# Patient Record
Sex: FEMALE | Race: WHITE | NOT HISPANIC OR LATINO | Employment: UNEMPLOYED | URBAN - METROPOLITAN AREA
[De-identification: names, ages, dates, MRNs, and addresses within clinical notes are randomized per-mention and may not be internally consistent; named-entity substitution may affect disease eponyms.]

---

## 2019-08-06 ENCOUNTER — TRANSCRIBE ORDERS (OUTPATIENT)
Dept: PHYSICAL THERAPY | Facility: CLINIC | Age: 74
End: 2019-08-06

## 2019-08-06 DIAGNOSIS — M51.36 DDD (DEGENERATIVE DISC DISEASE), LUMBAR: ICD-10-CM

## 2019-08-06 DIAGNOSIS — M54.32 LEFT SIDED SCIATICA: Primary | ICD-10-CM

## 2019-08-14 ENCOUNTER — OFFICE VISIT (OUTPATIENT)
Dept: PHYSICAL THERAPY | Facility: CLINIC | Age: 74
End: 2019-08-14

## 2019-08-14 DIAGNOSIS — M51.36 DDD (DEGENERATIVE DISC DISEASE), LUMBAR: ICD-10-CM

## 2019-08-14 DIAGNOSIS — M54.32 LEFT SIDED SCIATICA: Primary | ICD-10-CM

## 2019-08-14 PROCEDURE — 97161 PT EVAL LOW COMPLEX 20 MIN: CPT | Performed by: PHYSICAL THERAPIST

## 2019-08-14 PROCEDURE — 97110 THERAPEUTIC EXERCISES: CPT | Performed by: PHYSICAL THERAPIST

## 2019-08-14 NOTE — PROGRESS NOTES
Physical Therapy Initial Evaluation and Plan of Care      Patient: Gillian Del Rio   : 1945  Diagnosis/ICD-10 Code:  Left sided sciatica [M54.32]  Referring practitioner: Barber Mcnamara MD  Date of Initial Visit: 2019  Today's Date: 2019  Patient seen for 1 sessions           Subjective Questionnaire: Oswestry: 21/50=42%      Subjective Evaluation    History of Present Illness  Date of onset: 2019  Mechanism of injury: Insidious onset.  Doesn't recall injury.  Pain is worst when she lifts her leg on her back to get out of bed.      Subjective comment: 73 YOF with left anterior and posterior hip pain and low back pain that started in the middle of 2018.  She notes she had left hip replacement surgery in , and the pain started a few months after that.  She reports pain is mostly when she raises her leg straight to get out of bed.  Improves with walking  Patient Occupation: Retired Quality of life: good    Pain  Current pain ratin  At best pain ratin  At worst pain rating: 10  Location: Left anterior hip, posterior hip, low back  Quality: sharp  Relieving factors: rest and change in position  Aggravating factors: lifting  Progression: worsening    Social Support  Lives with: spouse    Diagnostic Tests  X-ray: normal  MRI studies: normal    Treatments  Treatments tried: Exercies provided by MD office worsened symptoms.  Patient Goals  Patient goals for therapy: decreased pain, increased strength and independence with ADLs/IADLs  Patient goal: Be able to get out of bed without pain           Objective       Tenderness     Additional Tenderness Details  Along vertebra of lumbar spine and anterior hip.  Negative medial or lateral hip tenderness with palpation of adductors and abductors.  Rectus femoris TTP.    Neurological Testing     Sensation     Lumbar   Left   Intact: light touch    Right   Intact: light touch    Reflexes   Left   Patellar (L4): normal (2+)  Achilles (S1):  normal (2+)    Right   Patellar (L4): normal (2+)  Achilles (S1): normal (2+)    Active Range of Motion     Lumbar   Normal active range of motion    Additional Active Range of Motion Details  Hip ROM WNL.  Noted anterior hip tightness left thigh.  90 degree knee flexion prone with tightness in front of thigh.    Strength/Myotome Testing     Left Hip   Planes of Motion   Flexion: 4-  Extension: 4-  Abduction: 4  Adduction: 4    Left Knee   Flexion: 4  Extension: 4    Left Ankle/Foot   Dorsiflexion: 4  Plantar flexion: 4    Tests       Thoracic   Negative slump.     Lumbar   Negative repeated extension and repeated flexion.     Left   Positive femoral stretch.   Negative passive lumbar instability and passive SLR.     Right   Negative passive SLR.     Left Knee   Positive peroneal nerve tension.     Ambulation     Observational Gait   Gait: within functional limits          Assessment & Plan     Assessment  Impairments: abnormal gait, abnormal muscle tone, abnormal or restricted ROM, activity intolerance, impaired balance, impaired physical strength, lacks appropriate home exercise program, pain with function and safety issue  Assessment details: Pt. Is an 73 YOF with reports of left hip pain that worsens when she gets out of bed first thing in the morning and at night when she is lying down.  She presents with impaired anterior LE chain mobility and left hip/trunk weakness. She reports a 42% RAUL score/impairment.   Physical therapy services are recommended to restore bed mobility, improve safety, and improved functional mobility.    Barriers to therapy: none  Prognosis: good  Functional Limitations: sleeping, walking, moving in bed, standing and reaching overhead  Goals  Plan Goals: LTGx2 weeks:  -Pt. Will be I with HEP.  -Pt. Will be able to lift leg out of bed without pain.  -Pt. Will report 10% improvement or greater on RAUL.      Plan  Therapy options: will be seen for skilled physical therapy services  Planned  therapy interventions: manual therapy, neuromuscular re-education, strengthening, stretching, therapeutic activities, postural training, flexibility, functional ROM exercises, gait training, home exercise program, IADL retraining and transfer training  Frequency: 2x week  Duration in visits: 4  Treatment plan discussed with: patient        Manual Therapy:         mins  80620;  Therapeutic Exercise:    10     mins  24685;     Neuromuscular Martha:        mins  42083;    Therapeutic Activity:          mins  23155;     Gait Training:           mins  43908;     Ultrasound:          mins  79033;    Electrical Stimulation:         mins  84816 ( );  Dry Needling          mins self-pay    Timed Treatment:   50   mins   Total Treatment:     50   mins    PT SIGNATURE: Pablo Tran, NEMO   DATE TREATMENT INITIATED: 8/14/2019    Medicare Initial Certification  Certification Period: 11/12/2019  I certify that the therapy services are furnished while this patient is under my care.  The services outlined above are required by this patient, and will be reviewed every 90 days.     PHYSICIAN: Barber Mcnamara MD      DATE:     Please sign and return via fax to  .. Thank you, [facility].

## 2019-08-21 ENCOUNTER — OFFICE VISIT (OUTPATIENT)
Dept: PHYSICAL THERAPY | Facility: CLINIC | Age: 74
End: 2019-08-21

## 2019-08-21 DIAGNOSIS — M54.32 LEFT SIDED SCIATICA: Primary | ICD-10-CM

## 2019-08-21 PROCEDURE — 97110 THERAPEUTIC EXERCISES: CPT | Performed by: PHYSICAL THERAPIST

## 2019-08-21 NOTE — PROGRESS NOTES
Physical Therapy Daily Progress Note        Patient: Gillian Del Rio   : 1945  Diagnosis/ICD-10 Code:  Left sided sciatica [M54.32]  Referring practitioner: Barber Mcnamara MD  Date of Initial Visit: Type: THERAPY  Noted: 2019  Today's Date: 2019  Patient seen for 2 sessions         Gililan Del Rio reports: no change since eval.          Subjective     Objective   See Exercise, Manual, and Modality Logs for complete treatment.   TTP at left rectus femoris.    Assessment/Plan  Pt. Reports less pain after treatment.  Updated HEP and issued home program.    Progress per Plan of Care           Manual Therapy:         mins  22772;  Therapeutic Exercise:    39     mins  91008;     Neuromuscular Martha:        mins  16694;    Therapeutic Activity:          mins  50986;     Gait Training:           mins  67112;     Ultrasound:          mins  64361;    Electrical Stimulation:         mins  76626 ( );  Dry Needling          mins self-pay    Timed Treatment:   39   mins   Total Treatment:     39   mins    Pablo Tran, PT  Physical Therapist

## 2019-08-23 ENCOUNTER — OFFICE VISIT (OUTPATIENT)
Dept: PHYSICAL THERAPY | Facility: CLINIC | Age: 74
End: 2019-08-23

## 2019-08-23 DIAGNOSIS — M54.32 LEFT SIDED SCIATICA: Primary | ICD-10-CM

## 2019-08-23 PROCEDURE — 97110 THERAPEUTIC EXERCISES: CPT | Performed by: PHYSICAL THERAPIST

## 2019-08-26 NOTE — PROGRESS NOTES
Physical Therapy Daily Progress Note        Patient: Gillian Del Rio   : 1945  Diagnosis/ICD-10 Code:  Left sided sciatica [M54.32]  Referring practitioner: Barber Mcnaamra MD  Date of Initial Visit: Type: THERAPY  Noted: 2019  Today's Date: 2019  Patient seen for 3 sessions         Gillian Del Rio reports: Able to raise leg easier.  Still has some pain however.          Subjective     Objective   See Exercise, Manual, and Modality Logs for complete treatment.       Assessment/Plan  Pt. With improved function of left leg.  Progressed ADD strengthening today.  Overall, less pain in clinic reported with exercises.  Progress per Plan of Care           Manual Therapy:         mins  83670;  Therapeutic Exercise:   10      mins  10318;     Neuromuscular Martha:        mins  50763;    Therapeutic Activity:          mins  91805;     Gait Training:           mins  44831;     Ultrasound:          mins  96454;    Electrical Stimulation:         mins  78474 ( );  Dry Needling          mins self-pay    Timed Treatment:   10   mins   Total Treatment:     35   mins    Pablo Tran, PT  Physical Therapist

## 2019-08-29 ENCOUNTER — OFFICE VISIT (OUTPATIENT)
Dept: PHYSICAL THERAPY | Facility: CLINIC | Age: 74
End: 2019-08-29

## 2019-08-29 DIAGNOSIS — M54.32 LEFT SIDED SCIATICA: Primary | ICD-10-CM

## 2019-08-29 DIAGNOSIS — M51.36 DDD (DEGENERATIVE DISC DISEASE), LUMBAR: ICD-10-CM

## 2019-08-29 PROCEDURE — 97110 THERAPEUTIC EXERCISES: CPT | Performed by: PHYSICAL THERAPIST

## 2019-08-29 NOTE — PROGRESS NOTES
Physical Therapy Daily Progress Note/Discharge Note    VISIT#: 4    Subjective   Gillian Del Rio's chief c/o is that she still gets pain with getting out of bed, but it is getting better. Pt is concerned with doing the hip flexor stretch off the side of the bed as she's afraid she will fall out of bed. Pt feels she can do I HEP at this time. Pain is 8/10 at worst.     Pain Rating (0/10): 2    SUBJECTIVE QUESTIONNAIRE: LEFS - 64/80; Oswestry 28% limited    Objective     Active Range of Motion      Lumbar   Normal active range of motion    Additional Active Range of Motion Details  Hip ROM WNL.  Still appears with some anterior hip tightness left thigh.  103 degree knee flexion prone with tightness in front of thigh.     Strength/Myotome Testing      Left Hip   Planes of Motion   Flexion: 4-  Extension: 4-  Abduction: 4+  Adduction: 4+     Left Knee   Flexion: 4+  Extension: 4+     Left Ankle/Foot   Dorsiflexion: 4+  Plantar flexion: 4+     Tests         Thoracic   Negative slump.      Lumbar   Negative repeated extension and repeated flexion.      Left   Positive femoral stretch (min/mod restrictions).   Negative passive lumbar instability and passive SLR.      Right   Negative passive SLR.      Left Knee   Positive peroneal nerve tension (slight/mild restrictions).      Ambulation      Observational Gait   Gait: within functional limits     See Exercise, Manual, and Modality Logs for complete treatment.     Patient Education: reviewed HEP and different ways to stretch hip flexors    Assessment/Plan Pt is demonstrating improved strength in all areas except hip flex/ext. Pt c/o R knee pain with hip flexor stretches in lunge position. Pt appeared to prefer prone stretching and instr in supine off end of bed.     Goals  Plan Goals: LTGx2 weeks:  -Pt. Will be I with HEP. - Met  -Pt. Will be able to lift leg out of bed without pain. - Partially Met  -Pt. Will report 10% improvement or greater on RAUL. - Met    D/C to I HEP  at the end of today's session.          Timed:         Manual Therapy:         mins  75985;     Therapeutic Exercise:    23    mins  47427;     Neuromuscular Martha:        mins  84203;    Therapeutic Activity:     5     mins  35737;     Gait Training:           mins  01507;     Ultrasound:          mins  28251;    Ionto                                   mins   11035  Self Care                            mins   34182  Canalith Repos                   mins  13327    Un-Timed:  Electrical Stimulation:         mins  98586 ( );  Dry Needling          mins self-pay  Traction         mins 71342  Low Eval          Mins  33444  Mod Eval          Mins  94337  High Eval                            Mins  44257  Re-Eval                               mins  37628    Timed Treatment:   28   mins   Total Treatment:     28  mins    Brooke Garner PT  [place] License # [id number]  Physical Therapist

## 2020-06-01 ENCOUNTER — OFFICE (AMBULATORY)
Dept: URBAN - METROPOLITAN AREA CLINIC 64 | Facility: CLINIC | Age: 75
End: 2020-06-01

## 2020-06-01 VITALS
SYSTOLIC BLOOD PRESSURE: 103 MMHG | DIASTOLIC BLOOD PRESSURE: 69 MMHG | HEART RATE: 57 BPM | WEIGHT: 139 LBS | HEIGHT: 66 IN

## 2020-06-01 DIAGNOSIS — R07.89 OTHER CHEST PAIN: ICD-10-CM

## 2020-06-01 DIAGNOSIS — Z86.010 PERSONAL HISTORY OF COLONIC POLYPS: ICD-10-CM

## 2020-06-01 DIAGNOSIS — R13.10 DYSPHAGIA, UNSPECIFIED: ICD-10-CM

## 2020-06-01 DIAGNOSIS — R10.13 EPIGASTRIC PAIN: ICD-10-CM

## 2020-06-01 PROCEDURE — 99204 OFFICE O/P NEW MOD 45 MIN: CPT | Performed by: INTERNAL MEDICINE

## 2020-07-07 ENCOUNTER — OFFICE (AMBULATORY)
Dept: URBAN - METROPOLITAN AREA PATHOLOGY 4 | Facility: PATHOLOGY | Age: 75
End: 2020-07-07
Payer: COMMERCIAL

## 2020-07-07 ENCOUNTER — ON CAMPUS - OUTPATIENT (AMBULATORY)
Dept: URBAN - METROPOLITAN AREA HOSPITAL 2 | Facility: HOSPITAL | Age: 75
End: 2020-07-07

## 2020-07-07 VITALS
HEART RATE: 51 BPM | HEART RATE: 57 BPM | WEIGHT: 143 LBS | DIASTOLIC BLOOD PRESSURE: 68 MMHG | OXYGEN SATURATION: 99 % | DIASTOLIC BLOOD PRESSURE: 57 MMHG | SYSTOLIC BLOOD PRESSURE: 117 MMHG | DIASTOLIC BLOOD PRESSURE: 66 MMHG | SYSTOLIC BLOOD PRESSURE: 106 MMHG | SYSTOLIC BLOOD PRESSURE: 113 MMHG | HEART RATE: 59 BPM | SYSTOLIC BLOOD PRESSURE: 136 MMHG | SYSTOLIC BLOOD PRESSURE: 139 MMHG | HEART RATE: 61 BPM | HEIGHT: 66 IN | DIASTOLIC BLOOD PRESSURE: 80 MMHG | RESPIRATION RATE: 18 BRPM | OXYGEN SATURATION: 100 % | HEART RATE: 63 BPM | DIASTOLIC BLOOD PRESSURE: 56 MMHG | SYSTOLIC BLOOD PRESSURE: 128 MMHG | SYSTOLIC BLOOD PRESSURE: 98 MMHG | SYSTOLIC BLOOD PRESSURE: 122 MMHG | SYSTOLIC BLOOD PRESSURE: 140 MMHG | DIASTOLIC BLOOD PRESSURE: 77 MMHG | DIASTOLIC BLOOD PRESSURE: 63 MMHG | SYSTOLIC BLOOD PRESSURE: 110 MMHG | DIASTOLIC BLOOD PRESSURE: 69 MMHG | TEMPERATURE: 96.9 F | OXYGEN SATURATION: 95 % | DIASTOLIC BLOOD PRESSURE: 90 MMHG | HEART RATE: 87 BPM | HEART RATE: 78 BPM | RESPIRATION RATE: 16 BRPM

## 2020-07-07 DIAGNOSIS — D12.5 BENIGN NEOPLASM OF SIGMOID COLON: ICD-10-CM

## 2020-07-07 DIAGNOSIS — Z48.815 ENCOUNTER FOR SURGICAL AFTERCARE FOLLOWING SURGERY ON THE DI: ICD-10-CM

## 2020-07-07 DIAGNOSIS — D12.2 BENIGN NEOPLASM OF ASCENDING COLON: ICD-10-CM

## 2020-07-07 DIAGNOSIS — D12.0 BENIGN NEOPLASM OF CECUM: ICD-10-CM

## 2020-07-07 DIAGNOSIS — R13.10 DYSPHAGIA, UNSPECIFIED: ICD-10-CM

## 2020-07-07 DIAGNOSIS — R10.13 EPIGASTRIC PAIN: ICD-10-CM

## 2020-07-07 DIAGNOSIS — Z86.010 PERSONAL HISTORY OF COLONIC POLYPS: ICD-10-CM

## 2020-07-07 DIAGNOSIS — D12.4 BENIGN NEOPLASM OF DESCENDING COLON: ICD-10-CM

## 2020-07-07 DIAGNOSIS — D12.6 BENIGN NEOPLASM OF COLON, UNSPECIFIED: ICD-10-CM

## 2020-07-07 PROBLEM — K63.5 POLYP OF COLON: Status: ACTIVE | Noted: 2020-07-07

## 2020-07-07 LAB
GI HISTOLOGY: A. UNSPECIFIED: (no result)
GI HISTOLOGY: B. UNSPECIFIED: (no result)
GI HISTOLOGY: C. UNSPECIFIED: (no result)
GI HISTOLOGY: D. UNSPECIFIED: (no result)
GI HISTOLOGY: PDF REPORT: (no result)

## 2020-07-07 PROCEDURE — 43450 DILATE ESOPHAGUS 1/MULT PASS: CPT | Performed by: INTERNAL MEDICINE

## 2020-07-07 PROCEDURE — 43235 EGD DIAGNOSTIC BRUSH WASH: CPT | Mod: 59 | Performed by: INTERNAL MEDICINE

## 2020-07-07 PROCEDURE — 45385 COLONOSCOPY W/LESION REMOVAL: CPT | Mod: PT | Performed by: INTERNAL MEDICINE

## 2020-07-07 PROCEDURE — 88305 TISSUE EXAM BY PATHOLOGIST: CPT | Mod: 26 | Performed by: INTERNAL MEDICINE

## 2021-08-30 ENCOUNTER — INPATIENT HOSPITAL (AMBULATORY)
Dept: URBAN - METROPOLITAN AREA HOSPITAL 76 | Facility: HOSPITAL | Age: 76
End: 2021-08-30
Payer: COMMERCIAL

## 2021-08-30 DIAGNOSIS — K52.9 NONINFECTIVE GASTROENTERITIS AND COLITIS, UNSPECIFIED: ICD-10-CM

## 2021-08-30 DIAGNOSIS — Z98.84 BARIATRIC SURGERY STATUS: ICD-10-CM

## 2021-08-30 DIAGNOSIS — R93.3 ABNORMAL FINDINGS ON DIAGNOSTIC IMAGING OF OTHER PARTS OF DI: ICD-10-CM

## 2021-08-30 PROCEDURE — 99221 1ST HOSP IP/OBS SF/LOW 40: CPT | Performed by: NURSE PRACTITIONER

## 2021-11-08 ENCOUNTER — OFFICE (AMBULATORY)
Dept: URBAN - METROPOLITAN AREA CLINIC 64 | Facility: CLINIC | Age: 76
End: 2021-11-08

## 2021-11-08 VITALS
HEIGHT: 66 IN | WEIGHT: 145 LBS | DIASTOLIC BLOOD PRESSURE: 69 MMHG | SYSTOLIC BLOOD PRESSURE: 137 MMHG | HEART RATE: 65 BPM

## 2021-11-08 DIAGNOSIS — R11.2 NAUSEA WITH VOMITING, UNSPECIFIED: ICD-10-CM

## 2021-11-08 DIAGNOSIS — R10.13 EPIGASTRIC PAIN: ICD-10-CM

## 2021-11-08 DIAGNOSIS — R13.10 DYSPHAGIA, UNSPECIFIED: ICD-10-CM

## 2021-11-08 PROCEDURE — 99213 OFFICE O/P EST LOW 20 MIN: CPT | Performed by: INTERNAL MEDICINE

## 2021-12-07 ENCOUNTER — LAB (OUTPATIENT)
Dept: LAB | Facility: HOSPITAL | Age: 76
End: 2021-12-07

## 2021-12-07 ENCOUNTER — TRANSCRIBE ORDERS (OUTPATIENT)
Dept: ADMINISTRATIVE | Facility: HOSPITAL | Age: 76
End: 2021-12-07

## 2021-12-07 DIAGNOSIS — R05.9 COUGH: ICD-10-CM

## 2021-12-07 DIAGNOSIS — R05.9 COUGH: Primary | ICD-10-CM

## 2021-12-07 PROCEDURE — C9803 HOPD COVID-19 SPEC COLLECT: HCPCS

## 2021-12-07 PROCEDURE — U0004 COV-19 TEST NON-CDC HGH THRU: HCPCS

## 2021-12-08 LAB — SARS-COV-2 ORF1AB RESP QL NAA+PROBE: NOT DETECTED

## 2021-12-31 ENCOUNTER — TRANSCRIBE ORDERS (OUTPATIENT)
Dept: ADMINISTRATIVE | Facility: HOSPITAL | Age: 76
End: 2021-12-31

## 2021-12-31 ENCOUNTER — LAB (OUTPATIENT)
Dept: LAB | Facility: HOSPITAL | Age: 76
End: 2021-12-31

## 2021-12-31 DIAGNOSIS — Z20.822 EXPOSURE TO COVID-19 VIRUS: Primary | ICD-10-CM

## 2021-12-31 DIAGNOSIS — Z20.822 EXPOSURE TO COVID-19 VIRUS: ICD-10-CM

## 2021-12-31 PROCEDURE — C9803 HOPD COVID-19 SPEC COLLECT: HCPCS

## 2021-12-31 PROCEDURE — U0004 COV-19 TEST NON-CDC HGH THRU: HCPCS

## 2022-01-01 LAB — SARS-COV-2 ORF1AB RESP QL NAA+PROBE: DETECTED

## 2023-03-30 ENCOUNTER — OFFICE (AMBULATORY)
Dept: URBAN - METROPOLITAN AREA PATHOLOGY 4 | Facility: PATHOLOGY | Age: 78
End: 2023-03-30
Payer: COMMERCIAL

## 2023-03-30 ENCOUNTER — ON CAMPUS - OUTPATIENT (AMBULATORY)
Dept: URBAN - METROPOLITAN AREA HOSPITAL 2 | Facility: HOSPITAL | Age: 78
End: 2023-03-30
Payer: COMMERCIAL

## 2023-03-30 VITALS
HEART RATE: 72 BPM | OXYGEN SATURATION: 99 % | SYSTOLIC BLOOD PRESSURE: 122 MMHG | HEIGHT: 66 IN | HEART RATE: 79 BPM | DIASTOLIC BLOOD PRESSURE: 70 MMHG | SYSTOLIC BLOOD PRESSURE: 100 MMHG | SYSTOLIC BLOOD PRESSURE: 107 MMHG | OXYGEN SATURATION: 100 % | HEART RATE: 77 BPM | DIASTOLIC BLOOD PRESSURE: 51 MMHG | WEIGHT: 134 LBS | HEART RATE: 74 BPM | HEART RATE: 73 BPM | RESPIRATION RATE: 16 BRPM | SYSTOLIC BLOOD PRESSURE: 93 MMHG | DIASTOLIC BLOOD PRESSURE: 50 MMHG | SYSTOLIC BLOOD PRESSURE: 113 MMHG | RESPIRATION RATE: 18 BRPM | TEMPERATURE: 98 F | HEART RATE: 87 BPM | DIASTOLIC BLOOD PRESSURE: 57 MMHG | DIASTOLIC BLOOD PRESSURE: 64 MMHG | DIASTOLIC BLOOD PRESSURE: 47 MMHG | HEART RATE: 69 BPM | DIASTOLIC BLOOD PRESSURE: 59 MMHG | RESPIRATION RATE: 12 BRPM | SYSTOLIC BLOOD PRESSURE: 103 MMHG | SYSTOLIC BLOOD PRESSURE: 106 MMHG | SYSTOLIC BLOOD PRESSURE: 105 MMHG | DIASTOLIC BLOOD PRESSURE: 62 MMHG

## 2023-03-30 DIAGNOSIS — Z86.010 PERSONAL HISTORY OF COLONIC POLYPS: ICD-10-CM

## 2023-03-30 DIAGNOSIS — D12.5 BENIGN NEOPLASM OF SIGMOID COLON: ICD-10-CM

## 2023-03-30 DIAGNOSIS — D12.4 BENIGN NEOPLASM OF DESCENDING COLON: ICD-10-CM

## 2023-03-30 DIAGNOSIS — D12.0 BENIGN NEOPLASM OF CECUM: ICD-10-CM

## 2023-03-30 DIAGNOSIS — D12.3 BENIGN NEOPLASM OF TRANSVERSE COLON: ICD-10-CM

## 2023-03-30 PROCEDURE — 45385 COLONOSCOPY W/LESION REMOVAL: CPT | Mod: PT | Performed by: INTERNAL MEDICINE

## 2023-03-30 PROCEDURE — 88305 TISSUE EXAM BY PATHOLOGIST: CPT | Mod: 26 | Performed by: INTERNAL MEDICINE

## 2023-10-24 ENCOUNTER — TRANSCRIBE ORDERS (OUTPATIENT)
Dept: ADMINISTRATIVE | Facility: HOSPITAL | Age: 78
End: 2023-10-24
Payer: MEDICARE

## 2023-10-24 DIAGNOSIS — K56.1 SMALL BOWEL INTUSSUSCEPTION: Primary | ICD-10-CM

## 2023-11-01 ENCOUNTER — APPOINTMENT (OUTPATIENT)
Dept: OTHER | Facility: HOSPITAL | Age: 78
End: 2023-11-01
Payer: MEDICARE

## 2023-11-01 ENCOUNTER — HOSPITAL ENCOUNTER (OUTPATIENT)
Dept: GENERAL RADIOLOGY | Facility: HOSPITAL | Age: 78
Discharge: HOME | End: 2023-11-01
Admitting: FAMILY MEDICINE
Payer: MEDICARE

## 2023-11-01 DIAGNOSIS — K56.1 SMALL BOWEL INTUSSUSCEPTION: ICD-10-CM

## 2023-11-01 DIAGNOSIS — Z09 FOLLOW-UP EXAM: ICD-10-CM

## 2023-11-01 PROCEDURE — A9270 NON-COVERED ITEM OR SERVICE: HCPCS | Performed by: FAMILY MEDICINE

## 2023-11-01 PROCEDURE — 63710000001 BARIUM SULFATE 96 % RECONSTITUTED SUSPENSION: Performed by: FAMILY MEDICINE

## 2023-11-01 PROCEDURE — 74248 X-RAY SM INT F-THRU STD: CPT

## 2023-11-01 PROCEDURE — 74246 X-RAY XM UPR GI TRC 2CNTRST: CPT

## 2023-11-01 PROCEDURE — 63710000001 BARIUM SULFATE 98 % RECONSTITUTED SUSPENSION: Performed by: FAMILY MEDICINE

## 2023-11-01 PROCEDURE — 63710000001 BARIUM SULFATE 700 MG TABLET: Performed by: FAMILY MEDICINE

## 2023-11-01 RX ADMIN — BARIUM SULFATE 700 MG: 700 TABLET ORAL at 09:56

## 2023-11-01 RX ADMIN — BARIUM SULFATE 135 ML: 980 POWDER, FOR SUSPENSION ORAL at 09:56

## 2023-11-01 RX ADMIN — BARIUM SULFATE 183 ML: 960 POWDER, FOR SUSPENSION ORAL at 09:56

## 2024-10-09 ENCOUNTER — HOSPITAL ENCOUNTER (OUTPATIENT)
Facility: HOSPITAL | Age: 79
Discharge: HOME | End: 2024-10-09
Attending: EMERGENCY MEDICINE | Admitting: EMERGENCY MEDICINE
Payer: MEDICARE

## 2024-10-09 ENCOUNTER — APPOINTMENT (OUTPATIENT)
Dept: GENERAL RADIOLOGY | Facility: HOSPITAL | Age: 79
End: 2024-10-09
Payer: MEDICARE

## 2024-10-09 VITALS
WEIGHT: 125 LBS | SYSTOLIC BLOOD PRESSURE: 120 MMHG | DIASTOLIC BLOOD PRESSURE: 70 MMHG | BODY MASS INDEX: 19.62 KG/M2 | HEIGHT: 67 IN | HEART RATE: 68 BPM | RESPIRATION RATE: 18 BRPM | OXYGEN SATURATION: 98 % | TEMPERATURE: 98.4 F

## 2024-10-09 DIAGNOSIS — S76.011A STRAIN OF MUSCLE OF RIGHT HIP, INITIAL ENCOUNTER: Primary | ICD-10-CM

## 2024-10-09 PROCEDURE — G0463 HOSPITAL OUTPT CLINIC VISIT: HCPCS

## 2024-10-09 PROCEDURE — 73502 X-RAY EXAM HIP UNI 2-3 VIEWS: CPT

## 2024-10-09 NOTE — DISCHARGE INSTRUCTIONS
Recommend 500 mg of Tylenol 2 times daily for the next 4 days.    Recommend application of heat to your right hip and gentle stretching    Recommend rest over the next several days follow-up with family doctor as needed return to ER for worsening symptoms

## 2024-10-09 NOTE — FSED PROVIDER NOTE
Subjective   History of Present Illness  79-year-old female reports she was working out earlier today at a fitness center using a pedal machine when she slipped and twisted her right leg on the pedal machine.  She reports hearing a pop in and around her right hip and was concerned she could have broken her hip.  She reports that she is weightbearing and she was able to walk into the ER.  She is denying pain or injury to any other part of her body.        Review of Systems   All other systems reviewed and are negative.      No past medical history on file.    Allergies   Allergen Reactions    Ciprofloxacin Anaphylaxis, Angioedema, Nausea Only and Swelling     Other reaction(s): Angioedema    Fentanyl Unknown - High Severity    Omeprazole Headache     Other reaction(s): GI Problems, Headache   Other reaction(s): GI Problems, headache       No past surgical history on file.    No family history on file.    Social History     Socioeconomic History    Marital status:            Objective   Physical Exam  Vitals and nursing note reviewed.   Constitutional:       General: She is not in acute distress.     Appearance: Normal appearance. She is normal weight.   HENT:      Head: Normocephalic and atraumatic.      Nose: Nose normal.      Mouth/Throat:      Mouth: Mucous membranes are moist.      Pharynx: Oropharynx is clear.   Eyes:      Extraocular Movements: Extraocular movements intact.      Conjunctiva/sclera: Conjunctivae normal.      Pupils: Pupils are equal, round, and reactive to light.   Cardiovascular:      Rate and Rhythm: Normal rate.      Pulses: Normal pulses.   Pulmonary:      Effort: Pulmonary effort is normal.      Breath sounds: Normal breath sounds.   Abdominal:      General: Abdomen is flat. Bowel sounds are normal.      Palpations: Abdomen is soft.   Musculoskeletal:         General: No swelling, tenderness, deformity or signs of injury. Normal range of motion.      Cervical back: Normal range of  motion and neck supple.      Comments: The patient's  at bedside I did examine her right hip and right buttock region I see no evidence of bruising redness swelling there is also no tenderness on exam of the right intertrochanteric region.  Patient is able to lift her knee without obvious stress.   Skin:     General: Skin is warm.      Capillary Refill: Capillary refill takes less than 2 seconds.   Neurological:      General: No focal deficit present.      Mental Status: She is alert and oriented to person, place, and time. Mental status is at baseline.      Sensory: No sensory deficit.      Motor: No weakness.         Procedures           ED Course  ED Course as of 10/09/24 1438   Wed Oct 09, 2024   1339 X-ray right hip  Impression:  No acute process.   [WF]      ED Course User Index  [WF] Storm Emerson Jr., MIS                                           Medical Decision Making  X-ray of the right hip is negative for acute findings.  I suspect patient may have strained a muscle of the right hip area.  Recommending application of heat and Tylenol.  She is agreeable to discharge at this time.    Problems Addressed:  Strain of muscle of right hip, initial encounter: complicated acute illness or injury    Amount and/or Complexity of Data Reviewed  Radiology: ordered.        Final diagnoses:   Strain of muscle of right hip, initial encounter       ED Disposition  ED Disposition       ED Disposition   Discharge    Condition   Stable    Comment   --               Roosevelt De MD  [address] [place] [address] [place] [id number] [phone]               Medication List      No changes were made to your prescriptions during this visit.